# Patient Record
Sex: MALE | Race: WHITE | NOT HISPANIC OR LATINO
[De-identification: names, ages, dates, MRNs, and addresses within clinical notes are randomized per-mention and may not be internally consistent; named-entity substitution may affect disease eponyms.]

---

## 2017-06-12 PROBLEM — Z00.00 ENCOUNTER FOR PREVENTIVE HEALTH EXAMINATION: Status: ACTIVE | Noted: 2017-06-12

## 2017-07-24 ENCOUNTER — APPOINTMENT (OUTPATIENT)
Dept: GASTROENTEROLOGY | Facility: HOSPITAL | Age: 57
End: 2017-07-24

## 2017-07-24 ENCOUNTER — RESULT REVIEW (OUTPATIENT)
Age: 57
End: 2017-07-24

## 2017-07-24 ENCOUNTER — OUTPATIENT (OUTPATIENT)
Dept: OUTPATIENT SERVICES | Facility: HOSPITAL | Age: 57
LOS: 1 days | End: 2017-07-24
Payer: COMMERCIAL

## 2017-07-24 DIAGNOSIS — K22.70 BARRETT'S ESOPHAGUS WITHOUT DYSPLASIA: ICD-10-CM

## 2017-07-24 PROCEDURE — 43252 EGD OPTICAL ENDOMICROSCOPY: CPT

## 2017-07-24 PROCEDURE — 88305 TISSUE EXAM BY PATHOLOGIST: CPT | Mod: 26

## 2017-07-24 PROCEDURE — 43239 EGD BIOPSY SINGLE/MULTIPLE: CPT | Mod: 59,GC

## 2017-07-24 PROCEDURE — 43235 EGD DIAGNOSTIC BRUSH WASH: CPT | Mod: 59,GC

## 2017-07-24 PROCEDURE — 88305 TISSUE EXAM BY PATHOLOGIST: CPT

## 2017-07-24 PROCEDURE — 43239 EGD BIOPSY SINGLE/MULTIPLE: CPT

## 2017-07-24 PROCEDURE — 43252 EGD OPTICAL ENDOMICROSCOPY: CPT | Mod: GC

## 2017-07-24 PROCEDURE — 88312 SPECIAL STAINS GROUP 1: CPT | Mod: 26

## 2017-07-24 PROCEDURE — 88312 SPECIAL STAINS GROUP 1: CPT

## 2017-08-03 ENCOUNTER — CHART COPY (OUTPATIENT)
Age: 57
End: 2017-08-03

## 2018-05-04 PROCEDURE — 43252 EGD OPTICAL ENDOMICROSCOPY: CPT | Mod: GC

## 2018-05-07 ENCOUNTER — OUTPATIENT (OUTPATIENT)
Dept: OUTPATIENT SERVICES | Facility: HOSPITAL | Age: 58
LOS: 1 days | End: 2018-05-07
Payer: COMMERCIAL

## 2018-05-07 ENCOUNTER — RESULT REVIEW (OUTPATIENT)
Age: 58
End: 2018-05-07

## 2018-05-07 ENCOUNTER — APPOINTMENT (OUTPATIENT)
Dept: GASTROENTEROLOGY | Facility: CLINIC | Age: 58
End: 2018-05-07

## 2018-05-07 DIAGNOSIS — K22.70 BARRETT'S ESOPHAGUS WITHOUT DYSPLASIA: ICD-10-CM

## 2018-05-07 PROCEDURE — 43239 EGD BIOPSY SINGLE/MULTIPLE: CPT

## 2018-05-07 PROCEDURE — 43252 EGD OPTICAL ENDOMICROSCOPY: CPT

## 2018-05-07 PROCEDURE — 43252 EGD OPTICAL ENDOMICROSCOPY: CPT | Mod: GC

## 2018-05-07 PROCEDURE — 43239 EGD BIOPSY SINGLE/MULTIPLE: CPT | Mod: 59,GC

## 2018-05-08 LAB — SURGICAL PATHOLOGY STUDY: SIGNIFICANT CHANGE UP

## 2018-05-16 ENCOUNTER — CHART COPY (OUTPATIENT)
Age: 58
End: 2018-05-16

## 2018-06-20 ENCOUNTER — APPOINTMENT (OUTPATIENT)
Dept: GASTROENTEROLOGY | Facility: CLINIC | Age: 58
End: 2018-06-20
Payer: MEDICARE

## 2018-06-20 VITALS
HEIGHT: 68 IN | HEART RATE: 66 BPM | BODY MASS INDEX: 25.01 KG/M2 | TEMPERATURE: 97.6 F | OXYGEN SATURATION: 97 % | DIASTOLIC BLOOD PRESSURE: 64 MMHG | RESPIRATION RATE: 14 BRPM | SYSTOLIC BLOOD PRESSURE: 134 MMHG | WEIGHT: 165 LBS

## 2018-06-20 DIAGNOSIS — Z78.9 OTHER SPECIFIED HEALTH STATUS: ICD-10-CM

## 2018-06-20 DIAGNOSIS — Z87.891 PERSONAL HISTORY OF NICOTINE DEPENDENCE: ICD-10-CM

## 2018-06-20 DIAGNOSIS — Z87.19 PERSONAL HISTORY OF OTHER DISEASES OF THE DIGESTIVE SYSTEM: ICD-10-CM

## 2018-06-20 PROCEDURE — 99215 OFFICE O/P EST HI 40 MIN: CPT

## 2018-06-20 RX ORDER — DIVALPROEX SODIUM 250 MG/1
250 TABLET, DELAYED RELEASE ORAL
Refills: 0 | Status: ACTIVE | COMMUNITY

## 2018-06-20 RX ORDER — GABAPENTIN 600 MG/1
600 TABLET, COATED ORAL
Refills: 0 | Status: ACTIVE | COMMUNITY

## 2018-06-20 RX ORDER — FENOFIBRATE 120 MG/1
TABLET ORAL
Refills: 0 | Status: ACTIVE | COMMUNITY

## 2018-06-20 RX ORDER — DIVALPROEX SODIUM 500 MG/1
500 TABLET, DELAYED RELEASE ORAL
Refills: 0 | Status: ACTIVE | COMMUNITY

## 2018-06-20 RX ORDER — ALBUTEROL SULFATE 90 UG/1
108 (90 BASE) AEROSOL, METERED RESPIRATORY (INHALATION)
Refills: 0 | Status: ACTIVE | COMMUNITY

## 2018-06-20 RX ORDER — ESCITALOPRAM OXALATE 20 MG/1
20 TABLET, FILM COATED ORAL
Refills: 0 | Status: ACTIVE | COMMUNITY

## 2018-06-20 RX ORDER — RANITIDINE HYDROCHLORIDE 300 MG/1
300 CAPSULE ORAL
Refills: 0 | Status: ACTIVE | COMMUNITY

## 2018-06-20 RX ORDER — PANTOPRAZOLE 40 MG/1
TABLET, DELAYED RELEASE ORAL
Refills: 0 | Status: ACTIVE | COMMUNITY

## 2018-07-23 ENCOUNTER — APPOINTMENT (OUTPATIENT)
Dept: GASTROENTEROLOGY | Facility: HOSPITAL | Age: 58
End: 2018-07-23

## 2018-07-23 ENCOUNTER — OUTPATIENT (OUTPATIENT)
Dept: OUTPATIENT SERVICES | Facility: HOSPITAL | Age: 58
LOS: 1 days | End: 2018-07-23
Payer: COMMERCIAL

## 2018-07-23 DIAGNOSIS — K22.70 BARRETT'S ESOPHAGUS WITHOUT DYSPLASIA: ICD-10-CM

## 2018-07-23 DIAGNOSIS — K22.710 BARRETT'S ESOPHAGUS WITH LOW GRADE DYSPLASIA: ICD-10-CM

## 2018-07-23 PROCEDURE — 43270 EGD LESION ABLATION: CPT

## 2018-07-23 PROCEDURE — C1769: CPT

## 2018-07-23 PROCEDURE — 43270 EGD LESION ABLATION: CPT | Mod: GC

## 2018-07-23 PROCEDURE — C1889: CPT

## 2018-07-23 RX ORDER — SUCRALFATE 1 G/1
1 TABLET ORAL 4 TIMES DAILY
Qty: 120 | Refills: 0 | Status: COMPLETED | COMMUNITY
Start: 2018-07-23

## 2018-07-23 RX ORDER — DIPHENHYDRAMINE HYDROCHLORIDE AND LIDOCAINE HYDROCHLORIDE AND ALUMINUM HYDROXIDE AND MAGNESIUM HYDRO
KIT EVERY 8 HOURS
Qty: 1 | Refills: 3 | Status: ACTIVE | COMMUNITY
Start: 2018-07-23 | End: 1900-01-01

## 2018-07-24 ENCOUNTER — RX RENEWAL (OUTPATIENT)
Age: 58
End: 2018-07-24

## 2018-10-15 ENCOUNTER — OUTPATIENT (OUTPATIENT)
Dept: OUTPATIENT SERVICES | Facility: HOSPITAL | Age: 58
LOS: 1 days | End: 2018-10-15
Payer: COMMERCIAL

## 2018-10-15 ENCOUNTER — APPOINTMENT (OUTPATIENT)
Dept: GASTROENTEROLOGY | Facility: HOSPITAL | Age: 58
End: 2018-10-15

## 2018-10-15 DIAGNOSIS — Z87.19 PERSONAL HISTORY OF OTHER DISEASES OF THE DIGESTIVE SYSTEM: ICD-10-CM

## 2018-10-15 PROCEDURE — 43270 EGD LESION ABLATION: CPT | Mod: GC

## 2018-10-15 PROCEDURE — 43270 EGD LESION ABLATION: CPT

## 2018-10-15 PROCEDURE — C1769: CPT

## 2018-10-15 PROCEDURE — C1889: CPT

## 2019-01-07 ENCOUNTER — APPOINTMENT (OUTPATIENT)
Dept: GASTROENTEROLOGY | Facility: HOSPITAL | Age: 59
End: 2019-01-07

## 2019-01-07 ENCOUNTER — OUTPATIENT (OUTPATIENT)
Dept: OUTPATIENT SERVICES | Facility: HOSPITAL | Age: 59
LOS: 1 days | End: 2019-01-07
Payer: COMMERCIAL

## 2019-01-07 DIAGNOSIS — K22.710 BARRETT'S ESOPHAGUS WITH LOW GRADE DYSPLASIA: ICD-10-CM

## 2019-01-07 PROCEDURE — C1889: CPT

## 2019-01-07 PROCEDURE — 43270 EGD LESION ABLATION: CPT

## 2019-01-07 PROCEDURE — 43270 EGD LESION ABLATION: CPT | Mod: GC

## 2019-01-07 RX ORDER — DIPHENHYDRAMINE HYDROCHLORIDE AND LIDOCAINE HYDROCHLORIDE AND ALUMINUM HYDROXIDE AND MAGNESIUM HYDRO
KIT EVERY 8 HOURS
Qty: 3 | Refills: 3 | Status: ACTIVE | COMMUNITY
Start: 2019-01-07 | End: 1900-01-01

## 2019-01-07 RX ORDER — SUCRALFATE 1 G/10ML
1 SUSPENSION ORAL 3 TIMES DAILY
Qty: 300 | Refills: 5 | Status: ACTIVE | COMMUNITY
Start: 2019-01-07 | End: 1900-01-01

## 2019-01-11 ENCOUNTER — RX RENEWAL (OUTPATIENT)
Age: 59
End: 2019-01-11

## 2019-01-11 RX ORDER — PANTOPRAZOLE 40 MG/1
40 TABLET, DELAYED RELEASE ORAL
Qty: 60 | Refills: 0 | Status: ACTIVE | COMMUNITY
Start: 2018-07-23 | End: 1900-01-01

## 2019-05-13 ENCOUNTER — OUTPATIENT (OUTPATIENT)
Dept: OUTPATIENT SERVICES | Facility: HOSPITAL | Age: 59
LOS: 1 days | End: 2019-05-13
Payer: COMMERCIAL

## 2019-05-13 ENCOUNTER — RESULT REVIEW (OUTPATIENT)
Age: 59
End: 2019-05-13

## 2019-05-13 ENCOUNTER — APPOINTMENT (OUTPATIENT)
Dept: GASTROENTEROLOGY | Facility: HOSPITAL | Age: 59
End: 2019-05-13

## 2019-05-13 DIAGNOSIS — K22.70 BARRETT'S ESOPHAGUS WITHOUT DYSPLASIA: ICD-10-CM

## 2019-05-13 PROCEDURE — 43239 EGD BIOPSY SINGLE/MULTIPLE: CPT | Mod: XS

## 2019-05-13 PROCEDURE — 43252 EGD OPTICAL ENDOMICROSCOPY: CPT | Mod: GC

## 2019-05-13 PROCEDURE — 43270 EGD LESION ABLATION: CPT

## 2019-05-13 PROCEDURE — 88305 TISSUE EXAM BY PATHOLOGIST: CPT | Mod: 26

## 2019-05-13 PROCEDURE — 88305 TISSUE EXAM BY PATHOLOGIST: CPT

## 2019-05-13 PROCEDURE — 43239 EGD BIOPSY SINGLE/MULTIPLE: CPT | Mod: 59,GC

## 2019-05-13 PROCEDURE — C1886: CPT

## 2019-05-13 PROCEDURE — 43270 EGD LESION ABLATION: CPT | Mod: GC

## 2019-08-05 ENCOUNTER — OUTPATIENT (OUTPATIENT)
Dept: OUTPATIENT SERVICES | Facility: HOSPITAL | Age: 59
LOS: 1 days | End: 2019-08-05
Payer: COMMERCIAL

## 2019-08-05 ENCOUNTER — APPOINTMENT (OUTPATIENT)
Dept: GASTROENTEROLOGY | Facility: HOSPITAL | Age: 59
End: 2019-08-05

## 2019-08-05 DIAGNOSIS — K22.70 BARRETT'S ESOPHAGUS WITHOUT DYSPLASIA: ICD-10-CM

## 2019-08-05 PROCEDURE — C1886: CPT

## 2019-08-05 PROCEDURE — 43270 EGD LESION ABLATION: CPT

## 2019-08-05 PROCEDURE — 43270 EGD LESION ABLATION: CPT | Mod: GC

## 2020-02-24 ENCOUNTER — APPOINTMENT (OUTPATIENT)
Dept: GASTROENTEROLOGY | Facility: HOSPITAL | Age: 60
End: 2020-02-24

## 2020-02-24 ENCOUNTER — OUTPATIENT (OUTPATIENT)
Dept: OUTPATIENT SERVICES | Facility: HOSPITAL | Age: 60
LOS: 1 days | End: 2020-02-24
Payer: COMMERCIAL

## 2020-02-24 DIAGNOSIS — K22.710 BARRETT'S ESOPHAGUS WITH LOW GRADE DYSPLASIA: ICD-10-CM

## 2020-02-24 PROCEDURE — C1886: CPT

## 2020-02-24 PROCEDURE — 43270 EGD LESION ABLATION: CPT

## 2020-02-24 PROCEDURE — 43270 EGD LESION ABLATION: CPT | Mod: GC

## 2020-08-07 ENCOUNTER — APPOINTMENT (OUTPATIENT)
Dept: DISASTER EMERGENCY | Facility: CLINIC | Age: 60
End: 2020-08-07

## 2020-08-07 DIAGNOSIS — Z01.818 ENCOUNTER FOR OTHER PREPROCEDURAL EXAMINATION: ICD-10-CM

## 2020-08-08 LAB — SARS-COV-2 N GENE NPH QL NAA+PROBE: NOT DETECTED

## 2020-08-10 ENCOUNTER — APPOINTMENT (OUTPATIENT)
Dept: GASTROENTEROLOGY | Facility: HOSPITAL | Age: 60
End: 2020-08-10

## 2020-08-17 ENCOUNTER — OUTPATIENT (OUTPATIENT)
Dept: OUTPATIENT SERVICES | Facility: HOSPITAL | Age: 60
LOS: 1 days | End: 2020-08-17
Payer: COMMERCIAL

## 2020-08-17 ENCOUNTER — APPOINTMENT (OUTPATIENT)
Dept: ULTRASOUND IMAGING | Facility: IMAGING CENTER | Age: 60
End: 2020-08-17
Payer: MEDICARE

## 2020-08-17 DIAGNOSIS — Z00.8 ENCOUNTER FOR OTHER GENERAL EXAMINATION: ICD-10-CM

## 2020-08-17 PROCEDURE — 76700 US EXAM ABDOM COMPLETE: CPT | Mod: 26

## 2020-08-17 PROCEDURE — 76700 US EXAM ABDOM COMPLETE: CPT

## 2020-12-23 ENCOUNTER — NON-APPOINTMENT (OUTPATIENT)
Age: 60
End: 2020-12-23

## 2020-12-26 ENCOUNTER — APPOINTMENT (OUTPATIENT)
Dept: DISASTER EMERGENCY | Facility: CLINIC | Age: 60
End: 2020-12-26

## 2020-12-26 LAB — SARS-COV-2 N GENE NPH QL NAA+PROBE: NOT DETECTED

## 2020-12-27 NOTE — PRE PROCEDURE NOTE - PRE PROCEDURE EVALUATION
Attending Physician:      Tal                   Procedure: EGD ablation    Indication for Procedure: Dominguez's  ________________________________________________________  PAST MEDICAL & SURGICAL HISTORY:    ALLERGIES:  No Known Allergies    HOME MEDICATIONS:    AICD/PPM: [ ] yes   [ ] no    PERTINENT LAB DATA:                      PHYSICAL EXAMINATION:    T(C): --  HR: --  BP: --  RR: --  SpO2: --    Constitutional: NAD  HEENT: PERRLA, EOMI,    Neck:  No JVD  Respiratory: CTAB/L  Cardiovascular: S1 and S2  Gastrointestinal: BS+, soft, NT/ND  Extremities: No peripheral edema  Neurological: A/O x 3, no focal deficits  Psychiatric: Normal mood, normal affect  Skin: No rashes    ASA Class: I [ ]  II [ ]  III [ ]  IV [ ]    COMMENTS:    The patient is a suitable candidate for the planned procedure unless box checked [ ]  No, explain:

## 2020-12-28 ENCOUNTER — APPOINTMENT (OUTPATIENT)
Dept: GASTROENTEROLOGY | Facility: HOSPITAL | Age: 60
End: 2020-12-28

## 2020-12-28 ENCOUNTER — OUTPATIENT (OUTPATIENT)
Dept: OUTPATIENT SERVICES | Facility: HOSPITAL | Age: 60
LOS: 1 days | End: 2020-12-28
Payer: COMMERCIAL

## 2020-12-28 VITALS
DIASTOLIC BLOOD PRESSURE: 66 MMHG | OXYGEN SATURATION: 98 % | HEIGHT: 68 IN | TEMPERATURE: 97 F | WEIGHT: 149.91 LBS | SYSTOLIC BLOOD PRESSURE: 137 MMHG | RESPIRATION RATE: 18 BRPM | HEART RATE: 59 BPM

## 2020-12-28 VITALS
HEART RATE: 64 BPM | DIASTOLIC BLOOD PRESSURE: 68 MMHG | RESPIRATION RATE: 20 BRPM | OXYGEN SATURATION: 99 % | SYSTOLIC BLOOD PRESSURE: 144 MMHG

## 2020-12-28 DIAGNOSIS — K40.90 UNILATERAL INGUINAL HERNIA, WITHOUT OBSTRUCTION OR GANGRENE, NOT SPECIFIED AS RECURRENT: Chronic | ICD-10-CM

## 2020-12-28 DIAGNOSIS — J33.8 OTHER POLYP OF SINUS: Chronic | ICD-10-CM

## 2020-12-28 DIAGNOSIS — M72.0 PALMAR FASCIAL FIBROMATOSIS [DUPUYTREN]: Chronic | ICD-10-CM

## 2020-12-28 DIAGNOSIS — K22.710 BARRETT'S ESOPHAGUS WITH LOW GRADE DYSPLASIA: ICD-10-CM

## 2020-12-28 PROCEDURE — C1886: CPT

## 2020-12-28 PROCEDURE — 43270 EGD LESION ABLATION: CPT

## 2020-12-28 PROCEDURE — 43270 EGD LESION ABLATION: CPT | Mod: GC

## 2020-12-28 RX ORDER — SUCRALFATE 1 G
1 TABLET ORAL ONCE
Refills: 0 | Status: COMPLETED | OUTPATIENT
Start: 2020-12-28 | End: 2020-12-28

## 2020-12-28 RX ORDER — DIPHENHYDRAMINE HYDROCHLORIDE AND LIDOCAINE HYDROCHLORIDE AND ALUMINUM HYDROXIDE AND MAGNESIUM HYDRO
10 KIT ONCE
Refills: 0 | Status: COMPLETED | OUTPATIENT
Start: 2020-12-28 | End: 2020-12-28

## 2020-12-28 RX ADMIN — DIPHENHYDRAMINE HYDROCHLORIDE AND LIDOCAINE HYDROCHLORIDE AND ALUMINUM HYDROXIDE AND MAGNESIUM HYDRO 10 MILLILITER(S): KIT at 12:36

## 2020-12-28 RX ADMIN — Medication 1 GRAM(S): at 12:32

## 2020-12-28 NOTE — ASU DISCHARGE PLAN (ADULT/PEDIATRIC) - NSDISCH_DIET_ENDO_ALL_CORE_FT
You should resume your previous diet unless otherwise instructed by your doctor. Do not drink alcoholic beverages for the next 24 hours. Follow post ablation instructions

## 2020-12-28 NOTE — PRE-ANESTHESIA EVALUATION ADULT - NSANTHOSAYNRD_GEN_A_CORE
No. NATALI screening performed.  STOP BANG Legend: 0-2 = LOW Risk; 3-4 = INTERMEDIATE Risk; 5-8 = HIGH Risk

## 2020-12-28 NOTE — ASU PATIENT PROFILE, ADULT - PMH
Dominguez's esophagus without dysplasia    Bipolar disorder, current episode mixed, mild    Hyperlipidemia, unspecified hyperlipidemia type    Pulmonary emphysema, unspecified emphysema type

## 2020-12-28 NOTE — ASU PATIENT PROFILE, ADULT - PSH
Accessory polyp of sinus    Inguinal hernia without obstruction or gangrene, recurrence not specified, unspecified laterality     Accessory polyp of sinus    Dupuytren's contracture of left hand    Inguinal hernia without obstruction or gangrene, recurrence not specified, unspecified laterality

## 2021-01-04 ENCOUNTER — NON-APPOINTMENT (OUTPATIENT)
Age: 61
End: 2021-01-04

## 2021-01-07 PROBLEM — E78.5 HYPERLIPIDEMIA, UNSPECIFIED: Chronic | Status: ACTIVE | Noted: 2020-12-28

## 2021-01-07 PROBLEM — J43.9 EMPHYSEMA, UNSPECIFIED: Chronic | Status: ACTIVE | Noted: 2020-12-28

## 2021-01-07 PROBLEM — F31.61 BIPOLAR DISORDER, CURRENT EPISODE MIXED, MILD: Chronic | Status: ACTIVE | Noted: 2020-12-28

## 2021-01-07 PROBLEM — K22.70 BARRETT'S ESOPHAGUS WITHOUT DYSPLASIA: Chronic | Status: ACTIVE | Noted: 2020-12-28

## 2021-03-26 ENCOUNTER — APPOINTMENT (OUTPATIENT)
Dept: DISASTER EMERGENCY | Facility: CLINIC | Age: 61
End: 2021-03-26

## 2021-03-27 LAB — SARS-COV-2 N GENE NPH QL NAA+PROBE: NOT DETECTED

## 2021-03-29 ENCOUNTER — APPOINTMENT (OUTPATIENT)
Dept: GASTROENTEROLOGY | Facility: HOSPITAL | Age: 61
End: 2021-03-29

## 2021-03-29 ENCOUNTER — OUTPATIENT (OUTPATIENT)
Dept: OUTPATIENT SERVICES | Facility: HOSPITAL | Age: 61
LOS: 1 days | End: 2021-03-29
Payer: COMMERCIAL

## 2021-03-29 VITALS
DIASTOLIC BLOOD PRESSURE: 76 MMHG | TEMPERATURE: 97 F | HEART RATE: 67 BPM | OXYGEN SATURATION: 96 % | RESPIRATION RATE: 17 BRPM | WEIGHT: 151.9 LBS | SYSTOLIC BLOOD PRESSURE: 126 MMHG | HEIGHT: 68 IN

## 2021-03-29 VITALS
DIASTOLIC BLOOD PRESSURE: 83 MMHG | RESPIRATION RATE: 15 BRPM | SYSTOLIC BLOOD PRESSURE: 143 MMHG | HEART RATE: 66 BPM | OXYGEN SATURATION: 98 %

## 2021-03-29 DIAGNOSIS — M72.0 PALMAR FASCIAL FIBROMATOSIS [DUPUYTREN]: Chronic | ICD-10-CM

## 2021-03-29 DIAGNOSIS — K22.710 BARRETT'S ESOPHAGUS WITH LOW GRADE DYSPLASIA: ICD-10-CM

## 2021-03-29 DIAGNOSIS — K40.90 UNILATERAL INGUINAL HERNIA, WITHOUT OBSTRUCTION OR GANGRENE, NOT SPECIFIED AS RECURRENT: Chronic | ICD-10-CM

## 2021-03-29 DIAGNOSIS — J33.8 OTHER POLYP OF SINUS: Chronic | ICD-10-CM

## 2021-03-29 PROCEDURE — C1886: CPT

## 2021-03-29 PROCEDURE — 43229 ESOPHAGOSCOPY LESION ABLATE: CPT | Mod: GC

## 2021-03-29 PROCEDURE — 43270 EGD LESION ABLATION: CPT

## 2021-03-29 RX ORDER — SUCRALFATE 1 G
1 TABLET ORAL ONCE
Refills: 0 | Status: DISCONTINUED | OUTPATIENT
Start: 2021-03-29 | End: 2021-04-12

## 2021-03-29 RX ORDER — DIPHENHYDRAMINE HYDROCHLORIDE AND LIDOCAINE HYDROCHLORIDE AND ALUMINUM HYDROXIDE AND MAGNESIUM HYDRO
5 KIT ONCE
Refills: 0 | Status: DISCONTINUED | OUTPATIENT
Start: 2021-03-29 | End: 2021-04-12

## 2021-03-29 NOTE — PRE-ANESTHESIA EVALUATION ADULT - NSPROPOSEDPROCEDFT_GEN_ALL_CORE
EGD Advancement Flap (Single) Text: The defect edges were debeveled with a #15 scalpel blade.  Given the location of the defect and the proximity to free margins a single advancement flap was deemed most appropriate.  Using a sterile surgical marker, an appropriate advancement flap was drawn incorporating the defect and placing the expected incisions within the relaxed skin tension lines where possible.    The area thus outlined was incised deep to adipose tissue with a #15 scalpel blade.  The skin margins were undermined to an appropriate distance in all directions utilizing iris scissors.

## 2021-03-29 NOTE — ASU PATIENT PROFILE, ADULT - PSH
Accessory polyp of sinus    Dupuytren's contracture of left hand    Inguinal hernia without obstruction or gangrene, recurrence not specified, unspecified laterality

## 2021-04-01 ENCOUNTER — NON-APPOINTMENT (OUTPATIENT)
Age: 61
End: 2021-04-01

## 2021-06-18 ENCOUNTER — APPOINTMENT (OUTPATIENT)
Dept: ULTRASOUND IMAGING | Facility: IMAGING CENTER | Age: 61
End: 2021-06-18
Payer: MEDICARE

## 2021-06-18 ENCOUNTER — OUTPATIENT (OUTPATIENT)
Dept: OUTPATIENT SERVICES | Facility: HOSPITAL | Age: 61
LOS: 1 days | End: 2021-06-18
Payer: COMMERCIAL

## 2021-06-18 DIAGNOSIS — J33.8 OTHER POLYP OF SINUS: Chronic | ICD-10-CM

## 2021-06-18 DIAGNOSIS — M72.0 PALMAR FASCIAL FIBROMATOSIS [DUPUYTREN]: Chronic | ICD-10-CM

## 2021-06-18 DIAGNOSIS — K40.90 UNILATERAL INGUINAL HERNIA, WITHOUT OBSTRUCTION OR GANGRENE, NOT SPECIFIED AS RECURRENT: Chronic | ICD-10-CM

## 2021-06-18 DIAGNOSIS — Z00.8 ENCOUNTER FOR OTHER GENERAL EXAMINATION: ICD-10-CM

## 2021-06-18 PROCEDURE — 76700 US EXAM ABDOM COMPLETE: CPT

## 2021-06-18 PROCEDURE — 76700 US EXAM ABDOM COMPLETE: CPT | Mod: 26

## 2021-07-23 ENCOUNTER — APPOINTMENT (OUTPATIENT)
Dept: DISASTER EMERGENCY | Facility: CLINIC | Age: 61
End: 2021-07-23

## 2021-07-26 ENCOUNTER — APPOINTMENT (OUTPATIENT)
Dept: GASTROENTEROLOGY | Facility: HOSPITAL | Age: 61
End: 2021-07-26

## 2021-07-26 ENCOUNTER — RESULT REVIEW (OUTPATIENT)
Age: 61
End: 2021-07-26

## 2021-07-26 ENCOUNTER — OUTPATIENT (OUTPATIENT)
Dept: OUTPATIENT SERVICES | Facility: HOSPITAL | Age: 61
LOS: 1 days | Discharge: HOME | End: 2021-07-26
Payer: MEDICARE

## 2021-07-26 VITALS
SYSTOLIC BLOOD PRESSURE: 123 MMHG | OXYGEN SATURATION: 95 % | RESPIRATION RATE: 14 BRPM | DIASTOLIC BLOOD PRESSURE: 67 MMHG | HEART RATE: 52 BPM

## 2021-07-26 VITALS
DIASTOLIC BLOOD PRESSURE: 77 MMHG | OXYGEN SATURATION: 94 % | WEIGHT: 158.07 LBS | HEART RATE: 60 BPM | RESPIRATION RATE: 17 BRPM | TEMPERATURE: 96 F | SYSTOLIC BLOOD PRESSURE: 131 MMHG | HEIGHT: 68 IN

## 2021-07-26 DIAGNOSIS — K22.710 BARRETT'S ESOPHAGUS WITH LOW GRADE DYSPLASIA: ICD-10-CM

## 2021-07-26 DIAGNOSIS — J33.8 OTHER POLYP OF SINUS: Chronic | ICD-10-CM

## 2021-07-26 DIAGNOSIS — K40.90 UNILATERAL INGUINAL HERNIA, WITHOUT OBSTRUCTION OR GANGRENE, NOT SPECIFIED AS RECURRENT: Chronic | ICD-10-CM

## 2021-07-26 DIAGNOSIS — M72.0 PALMAR FASCIAL FIBROMATOSIS [DUPUYTREN]: Chronic | ICD-10-CM

## 2021-07-26 LAB — SARS-COV-2 N GENE NPH QL NAA+PROBE: NOT DETECTED

## 2021-07-26 PROCEDURE — 88312 SPECIAL STAINS GROUP 1: CPT | Mod: 26

## 2021-07-26 PROCEDURE — 43239 EGD BIOPSY SINGLE/MULTIPLE: CPT | Mod: GC

## 2021-07-26 PROCEDURE — 88305 TISSUE EXAM BY PATHOLOGIST: CPT | Mod: 26

## 2021-07-26 RX ORDER — FENOFIBRIC ACID 105 MG/1
1 TABLET ORAL
Qty: 0 | Refills: 0 | DISCHARGE

## 2021-07-26 RX ORDER — PANTOPRAZOLE SODIUM 20 MG/1
1 TABLET, DELAYED RELEASE ORAL
Qty: 0 | Refills: 0 | DISCHARGE

## 2021-07-26 RX ORDER — SODIUM CHLORIDE 9 MG/ML
500 INJECTION INTRAMUSCULAR; INTRAVENOUS; SUBCUTANEOUS
Refills: 0 | Status: COMPLETED | OUTPATIENT
Start: 2021-07-26 | End: 2021-07-26

## 2021-07-26 RX ORDER — SUCRALFATE 1 G
0 TABLET ORAL
Qty: 0 | Refills: 0 | DISCHARGE

## 2021-07-26 RX ORDER — GABAPENTIN 400 MG/1
600 CAPSULE ORAL
Qty: 0 | Refills: 0 | DISCHARGE

## 2021-07-26 RX ORDER — ESCITALOPRAM OXALATE 10 MG/1
1 TABLET, FILM COATED ORAL
Qty: 0 | Refills: 0 | DISCHARGE

## 2021-07-26 RX ORDER — DIVALPROEX SODIUM 500 MG/1
1 TABLET, DELAYED RELEASE ORAL
Qty: 0 | Refills: 0 | DISCHARGE

## 2021-07-26 RX ADMIN — SODIUM CHLORIDE 75 MILLILITER(S): 9 INJECTION INTRAMUSCULAR; INTRAVENOUS; SUBCUTANEOUS at 07:50

## 2021-07-26 NOTE — ASU PREOP CHECKLIST - SITE MARKED BY ANESTHESIOLOGIST
An elderly female in NAD, lying flat in bed, well groomed  HEENT: TRICIA EOMI  Neck: Supple, no JVD, no thyromegaly  Lungs: clear, no rhonchi, no wheeze, no crackles  CVS: S1 S2 no M/R/G  Abdomen: obese, no tenderness, no organomegaly, BS present  Neuro: AO x 3, no focal weakness, non focal  Psych: appropriate affect  Skin: warm, dry  Ext: no edema, no clubbing  Msk: no joint swelling or deformities  Back: no CVA tenderness, no kyphosis/scoliosis n/a

## 2021-08-03 ENCOUNTER — NON-APPOINTMENT (OUTPATIENT)
Age: 61
End: 2021-08-03

## 2022-05-09 ENCOUNTER — NON-APPOINTMENT (OUTPATIENT)
Age: 62
End: 2022-05-09

## 2022-07-31 LAB — SARS-COV-2 N GENE NPH QL NAA+PROBE: NOT DETECTED

## 2022-08-01 ENCOUNTER — APPOINTMENT (OUTPATIENT)
Dept: GASTROENTEROLOGY | Facility: HOSPITAL | Age: 62
End: 2022-08-01

## 2022-08-01 ENCOUNTER — OUTPATIENT (OUTPATIENT)
Dept: OUTPATIENT SERVICES | Facility: HOSPITAL | Age: 62
LOS: 1 days | Discharge: HOME | End: 2022-08-01

## 2022-08-01 ENCOUNTER — RESULT REVIEW (OUTPATIENT)
Age: 62
End: 2022-08-01

## 2022-08-01 VITALS
WEIGHT: 149.91 LBS | OXYGEN SATURATION: 94 % | SYSTOLIC BLOOD PRESSURE: 140 MMHG | RESPIRATION RATE: 20 BRPM | HEART RATE: 66 BPM | TEMPERATURE: 98 F | HEIGHT: 68 IN | DIASTOLIC BLOOD PRESSURE: 84 MMHG

## 2022-08-01 VITALS
RESPIRATION RATE: 17 BRPM | OXYGEN SATURATION: 95 % | HEART RATE: 66 BPM | DIASTOLIC BLOOD PRESSURE: 70 MMHG | SYSTOLIC BLOOD PRESSURE: 126 MMHG

## 2022-08-01 DIAGNOSIS — K40.90 UNILATERAL INGUINAL HERNIA, WITHOUT OBSTRUCTION OR GANGRENE, NOT SPECIFIED AS RECURRENT: Chronic | ICD-10-CM

## 2022-08-01 DIAGNOSIS — J33.8 OTHER POLYP OF SINUS: Chronic | ICD-10-CM

## 2022-08-01 DIAGNOSIS — M72.0 PALMAR FASCIAL FIBROMATOSIS [DUPUYTREN]: Chronic | ICD-10-CM

## 2022-08-01 DIAGNOSIS — K22.710 BARRETT'S ESOPHAGUS WITH LOW GRADE DYSPLASIA: ICD-10-CM

## 2022-08-01 PROCEDURE — 43239 EGD BIOPSY SINGLE/MULTIPLE: CPT | Mod: GC

## 2022-08-01 PROCEDURE — 88305 TISSUE EXAM BY PATHOLOGIST: CPT | Mod: 26

## 2022-08-01 RX ORDER — SODIUM CHLORIDE 9 MG/ML
500 INJECTION, SOLUTION INTRAVENOUS
Refills: 0 | Status: DISCONTINUED | OUTPATIENT
Start: 2022-08-01 | End: 2022-08-15

## 2022-08-01 NOTE — ASU PATIENT PROFILE, ADULT - NSICDXPASTMEDICALHX_GEN_ALL_CORE_FT
PAST MEDICAL HISTORY:  Dominguez's esophagus without dysplasia     Bipolar disorder, current episode mixed, mild     Hyperlipidemia, unspecified hyperlipidemia type     Pulmonary emphysema, unspecified emphysema type

## 2022-08-01 NOTE — ASU PATIENT PROFILE, ADULT - NSICDXPASTSURGICALHX_GEN_ALL_CORE_FT
PAST SURGICAL HISTORY:  Accessory polyp of sinus     Dupuytren's contracture of left hand     Inguinal hernia without obstruction or gangrene, recurrence not specified, unspecified laterality

## 2022-08-01 NOTE — ASU PREOP CHECKLIST - IV STARTED
Assisted pt in recovery room. Baby grunty and skin to skin. Discussed lactation and goals with pt. Encouraged skin to skin and frequent attempts at breast to stimulate milk production. Instructed on normal infant behavior in the first 12-24 hours and importance of stimulating the baby frequently to eat during this time. Reviewed hand expression, and encouraged to hand express drops of colostrum when baby is sleepy. Instructed that baby may also feed 8-12 times a day- cluster feeding at times- as her milk supply is being established. Instructed on benefits of skin to skin and avoidance of pacifier / artificial nipple use until breastfeeding is well established. Educated on making sure infant has an open airway while breastfeeding and skin to skin. Instructed on hunger cues and waking techniques to try. Reviewed signs of adequate I & O; allow baby to feed ad emile and not to limit time at breast. Information given regarding health benefits of colostrum and exclusive breastfeeding. Hand expressed with mother to provide baby with several tsp of colostrum, baby then went to breast once grunting resolved, SpO2 and HR remained WNL, baby pink throughout feeding. Baby maintained comfortable latch for at least 5 min with additional off and on before placing skin to skin. Encouraged to call with any concerns.
Nursing, pumping to stimulate her milk supply & supplementing with EBM/ formula. Encouraged to feed baby q 3 hr. Has signed script for breast pump- they are going through their own DME to get a pump for home.
yes

## 2022-08-01 NOTE — ASU PATIENT PROFILE, ADULT - FALL HARM RISK - UNIVERSAL INTERVENTIONS
Bed in lowest position, wheels locked, appropriate side rails in place/Call bell, personal items and telephone in reach/Instruct patient to call for assistance before getting out of bed or chair/Non-slip footwear when patient is out of bed/[place] to call system/Physically safe environment - no spills, clutter or unnecessary equipment/Purposeful Proactive Rounding/Room/bathroom lighting operational, light cord in reach

## 2022-09-19 ENCOUNTER — APPOINTMENT (OUTPATIENT)
Dept: RADIOLOGY | Facility: IMAGING CENTER | Age: 62
End: 2022-09-19

## 2022-09-19 ENCOUNTER — OUTPATIENT (OUTPATIENT)
Dept: OUTPATIENT SERVICES | Facility: HOSPITAL | Age: 62
LOS: 1 days | End: 2022-09-19
Payer: COMMERCIAL

## 2022-09-19 DIAGNOSIS — M72.0 PALMAR FASCIAL FIBROMATOSIS [DUPUYTREN]: Chronic | ICD-10-CM

## 2022-09-19 DIAGNOSIS — J33.8 OTHER POLYP OF SINUS: Chronic | ICD-10-CM

## 2022-09-19 DIAGNOSIS — K40.90 UNILATERAL INGUINAL HERNIA, WITHOUT OBSTRUCTION OR GANGRENE, NOT SPECIFIED AS RECURRENT: Chronic | ICD-10-CM

## 2022-09-19 DIAGNOSIS — Z00.00 ENCOUNTER FOR GENERAL ADULT MEDICAL EXAMINATION WITHOUT ABNORMAL FINDINGS: ICD-10-CM

## 2022-09-19 PROCEDURE — 71046 X-RAY EXAM CHEST 2 VIEWS: CPT | Mod: 26

## 2022-09-19 PROCEDURE — 71046 X-RAY EXAM CHEST 2 VIEWS: CPT

## 2022-09-22 ENCOUNTER — TRANSCRIPTION ENCOUNTER (OUTPATIENT)
Age: 62
End: 2022-09-22

## 2023-04-19 ENCOUNTER — NON-APPOINTMENT (OUTPATIENT)
Age: 63
End: 2023-04-19

## 2023-08-07 ENCOUNTER — OUTPATIENT (OUTPATIENT)
Dept: OUTPATIENT SERVICES | Facility: HOSPITAL | Age: 63
LOS: 1 days | Discharge: HOME | End: 2023-08-07
Payer: MEDICARE

## 2023-08-07 ENCOUNTER — TRANSCRIPTION ENCOUNTER (OUTPATIENT)
Age: 63
End: 2023-08-07

## 2023-08-07 ENCOUNTER — RESULT REVIEW (OUTPATIENT)
Age: 63
End: 2023-08-07

## 2023-08-07 ENCOUNTER — APPOINTMENT (OUTPATIENT)
Dept: GASTROENTEROLOGY | Facility: HOSPITAL | Age: 63
End: 2023-08-07

## 2023-08-07 VITALS
DIASTOLIC BLOOD PRESSURE: 63 MMHG | HEART RATE: 61 BPM | OXYGEN SATURATION: 95 % | RESPIRATION RATE: 20 BRPM | SYSTOLIC BLOOD PRESSURE: 104 MMHG

## 2023-08-07 VITALS
OXYGEN SATURATION: 96 % | HEIGHT: 68 IN | WEIGHT: 149.91 LBS | SYSTOLIC BLOOD PRESSURE: 106 MMHG | RESPIRATION RATE: 17 BRPM | TEMPERATURE: 98 F | DIASTOLIC BLOOD PRESSURE: 89 MMHG | HEART RATE: 61 BPM

## 2023-08-07 DIAGNOSIS — K22.710 BARRETT'S ESOPHAGUS WITH LOW GRADE DYSPLASIA: ICD-10-CM

## 2023-08-07 DIAGNOSIS — K40.90 UNILATERAL INGUINAL HERNIA, WITHOUT OBSTRUCTION OR GANGRENE, NOT SPECIFIED AS RECURRENT: Chronic | ICD-10-CM

## 2023-08-07 DIAGNOSIS — J33.8 OTHER POLYP OF SINUS: Chronic | ICD-10-CM

## 2023-08-07 DIAGNOSIS — M72.0 PALMAR FASCIAL FIBROMATOSIS [DUPUYTREN]: Chronic | ICD-10-CM

## 2023-08-07 PROCEDURE — 88305 TISSUE EXAM BY PATHOLOGIST: CPT | Mod: 26

## 2023-08-07 PROCEDURE — 43239 EGD BIOPSY SINGLE/MULTIPLE: CPT | Mod: GC

## 2023-08-07 RX ORDER — SODIUM CHLORIDE 9 MG/ML
500 INJECTION, SOLUTION INTRAVENOUS
Refills: 0 | Status: DISCONTINUED | OUTPATIENT
Start: 2023-08-07 | End: 2023-08-21

## 2023-08-07 NOTE — ASU PATIENT PROFILE, ADULT - FALL HARM RISK - UNIVERSAL INTERVENTIONS
[facility] calling about the Nefazodone is on back order. They do not expect to fill this script for the next few weeks.      Ref #  [id number]
Bed in lowest position, wheels locked, appropriate side rails in place/Call bell, personal items and telephone in reach/Instruct patient to call for assistance before getting out of bed or chair/Non-slip footwear when patient is out of bed/[place] to call system/Physically safe environment - no spills, clutter or unnecessary equipment/Purposeful Proactive Rounding/Room/bathroom lighting operational, light cord in reach

## 2023-08-07 NOTE — ASU PATIENT PROFILE, ADULT - NS PRO PT RIGHT SUPPORT PERSON
Large Joint Aspiration/Injection: L knee    Date/Time: 10/14/2022 9:30 AM  Performed by: Justice Gibbs MD  Authorized by: Justice Gibbs MD     Consent Done?:  Yes (Verbal)  Timeout: prior to procedure the correct patient, procedure, and site was verified    Prep: patient was prepped and draped in usual sterile fashion      Details:  Needle Size:  21 G  Approach:  Anterolateral  Location:  Knee  Site:  L knee  Medications:  40 mg triamcinolone acetonide 40 mg/mL  Patient tolerance:  Patient tolerated the procedure well with no immediate complications   Declines

## 2023-08-07 NOTE — ASU DISCHARGE PLAN (ADULT/PEDIATRIC) - NS MD DC FALL RISK RISK
For information on Fall & Injury Prevention, visit: [email or web address] OR  [email or web address] OR  https://www.cdc.gov/steadi/patient.html

## 2023-08-07 NOTE — PRE PROCEDURE NOTE - PRE PROCEDURE EVALUATION
Attending Physician:   Dr. Parada    Procedure:  EGD    Indication for Procedure:  Surveillance for Dominguez's Esophagus  ________________________________________________________  PAST MEDICAL & SURGICAL HISTORY:  Bipolar disorder, current episode mixed, mild      Pulmonary emphysema, unspecified emphysema type      Hyperlipidemia, unspecified hyperlipidemia type      Dominguez's esophagus without dysplasia      Accessory polyp of sinus      Inguinal hernia without obstruction or gangrene, recurrence not specified, unspecified laterality      Dupuytren's contracture of left hand        ALLERGIES:  No Known Allergies    HOME MEDICATIONS:  divalproex sodium 500 mg oral tablet, extended release: 1 tab(s) orally once a day  escitalopram 20 mg oral tablet: 1 tab(s) orally once a day  fenofibric acid 135 mg oral delayed release capsule: 1 cap(s) orally once a day  gabapentin 600 mg oral tablet: 600 milligram(s) orally once a day  Lexapro 20 mg oral tablet: 1 tab(s) orally once a day  Protonix 40 mg oral delayed release tablet: 1 tab(s) orally once a day  sucralfate:     AICD/PPM: [ ] yes   [x ] no    PERTINENT LAB DATA:                      PHYSICAL EXAMINATION:    T(C): --  HR: --  BP: --  RR: --  SpO2: --    Constitutional: NAD  HEENT: PERRLA, EOMI,    Neck:  No JVD  Respiratory: CTAB/L  Cardiovascular: S1 and S2  Gastrointestinal: BS+, soft, NT/ND  Extremities: No peripheral edema  Neurological: A/O x 3, no focal deficits  Psychiatric: Normal mood, normal affect  Skin: No rashes    ASA Class: I [ ]  II [x ]  III [ ]  IV [ ]    COMMENTS:    The patient is a suitable candidate for the planned procedure unless box checked [ ]  No, explain:    I explained the risks (bleeding, infection, perforation, pancreatitis, CV risk, anesthesia risk)/b/a with the patient. The patient agrees to proceed.

## 2023-08-11 ENCOUNTER — NON-APPOINTMENT (OUTPATIENT)
Age: 63
End: 2023-08-11

## 2024-02-13 NOTE — ASU DISCHARGE PLAN (ADULT/PEDIATRIC) - NSDISCH_IV_ENDO_ALL_CORE_FT
If your Intravenous (IV) site becomes red, swollen or painful, call your doctor.
Bed/Stretcher in lowest position, wheels locked, appropriate side rails in place/Call bell, personal items and telephone in reach/Instruct patient to call for assistance before getting out of bed/chair/stretcher/Non-slip footwear applied when patient is off stretcher/[place] to call system/Physically safe environment - no spills, clutter or unnecessary equipment/Purposeful proactive rounding/Room/bathroom lighting operational, light cord in reach

## 2024-05-07 ENCOUNTER — NON-APPOINTMENT (OUTPATIENT)
Age: 64
End: 2024-05-07

## 2024-07-23 ENCOUNTER — APPOINTMENT (OUTPATIENT)
Dept: GASTROENTEROLOGY | Facility: HOSPITAL | Age: 64
End: 2024-07-23

## 2024-07-23 ENCOUNTER — RESULT REVIEW (OUTPATIENT)
Age: 64
End: 2024-07-23

## 2024-07-23 ENCOUNTER — TRANSCRIPTION ENCOUNTER (OUTPATIENT)
Age: 64
End: 2024-07-23
